# Patient Record
Sex: MALE | URBAN - METROPOLITAN AREA
[De-identification: names, ages, dates, MRNs, and addresses within clinical notes are randomized per-mention and may not be internally consistent; named-entity substitution may affect disease eponyms.]

---

## 2019-01-14 ENCOUNTER — IMPORTED ENCOUNTER (OUTPATIENT)
Dept: URBAN - METROPOLITAN AREA CLINIC 31 | Facility: CLINIC | Age: 19
End: 2019-01-14

## 2019-02-15 ENCOUNTER — IMPORTED ENCOUNTER (OUTPATIENT)
Dept: URBAN - METROPOLITAN AREA CLINIC 31 | Facility: CLINIC | Age: 19
End: 2019-02-15

## 2019-03-20 ENCOUNTER — IMPORTED ENCOUNTER (OUTPATIENT)
Dept: URBAN - METROPOLITAN AREA CLINIC 31 | Facility: CLINIC | Age: 19
End: 2019-03-20

## 2019-03-20 PROCEDURE — 92015 DETERMINE REFRACTIVE STATE: CPT

## 2019-03-20 PROCEDURE — 92004 COMPRE OPH EXAM NEW PT 1/>: CPT

## 2022-04-02 ASSESSMENT — TONOMETRY
OS_IOP_MMHG: 17
OD_IOP_MMHG: 16

## 2022-04-02 ASSESSMENT — VISUAL ACUITY
OS_CC: 20/200
OS_SC: 20/20
OD_CC: 20/200
OD_SC: 20/20

## 2023-12-07 ENCOUNTER — APPOINTMENT (RX ONLY)
Dept: URBAN - METROPOLITAN AREA CLINIC 107 | Facility: CLINIC | Age: 23
Setting detail: DERMATOLOGY
End: 2023-12-07

## 2023-12-07 VITALS — WEIGHT: 209 LBS | HEIGHT: 72 IN

## 2023-12-07 DIAGNOSIS — B07.8 OTHER VIRAL WARTS: ICD-10-CM

## 2023-12-07 PROCEDURE — ? LIQUID NITROGEN

## 2023-12-07 PROCEDURE — 11305 SHAVE SKIN LESION 0.5 CM/<: CPT | Mod: 59

## 2023-12-07 PROCEDURE — ? COUNSELING

## 2023-12-07 PROCEDURE — 17110 DESTRUCTION B9 LES UP TO 14: CPT

## 2023-12-07 PROCEDURE — ? SHAVE REMOVAL (NO PATHOLOGY)

## 2023-12-07 ASSESSMENT — LOCATION SIMPLE DESCRIPTION DERM
LOCATION SIMPLE: PENIS
LOCATION SIMPLE: LEFT SMALL FINGER

## 2023-12-07 ASSESSMENT — LOCATION ZONE DERM
LOCATION ZONE: FINGER
LOCATION ZONE: PENIS

## 2023-12-07 ASSESSMENT — LOCATION DETAILED DESCRIPTION DERM
LOCATION DETAILED: DORSAL GLANS
LOCATION DETAILED: LEFT MID DORSAL SMALL FINGER

## 2023-12-07 NOTE — PROCEDURE: LIQUID NITROGEN
Show Applicator Variable?: Yes
Post-Care Instructions: I reviewed with the patient in detail post-care instructions. Patient is to wear sunprotection, and avoid picking at any of the treated lesions. Pt may apply Vaseline to crusted or scabbing areas.
Spray Paint Text: The liquid nitrogen was applied to the skin utilizing a spray paint frosting technique.
Consent: The patient's consent was obtained including but not limited to risks of crusting, scabbing, blistering, scarring, darker or lighter pigmentary change, recurrence, incomplete removal and infection.
Add 52 Modifier (Optional): no
Number Of Freeze-Thaw Cycles: 3 freeze-thaw cycles
Medical Necessity Information: It is in your best interest to select a reason for this procedure from the list below. All of these items fulfill various CMS LCD requirements except the new and changing color options.
Medical Necessity Clause: This procedure was medically necessary because the lesions that were treated were:
Detail Level: Detailed